# Patient Record
Sex: MALE | Race: WHITE | URBAN - METROPOLITAN AREA
[De-identification: names, ages, dates, MRNs, and addresses within clinical notes are randomized per-mention and may not be internally consistent; named-entity substitution may affect disease eponyms.]

---

## 2019-08-01 ENCOUNTER — OFFICE VISIT (OUTPATIENT)
Dept: INTERNAL MEDICINE CLINIC | Age: 29
End: 2019-08-01

## 2019-08-01 VITALS
OXYGEN SATURATION: 99 % | SYSTOLIC BLOOD PRESSURE: 112 MMHG | TEMPERATURE: 98.5 F | WEIGHT: 210.5 LBS | DIASTOLIC BLOOD PRESSURE: 82 MMHG | RESPIRATION RATE: 18 BRPM | HEIGHT: 70 IN | HEART RATE: 81 BPM | BODY MASS INDEX: 30.14 KG/M2

## 2019-08-01 DIAGNOSIS — M79.674 TOE PAIN, RIGHT: ICD-10-CM

## 2019-08-01 DIAGNOSIS — M79.674 PAIN AND SWELLING OF TOE, RIGHT: Primary | ICD-10-CM

## 2019-08-01 DIAGNOSIS — E66.9 OBESITY (BMI 30-39.9): ICD-10-CM

## 2019-08-01 DIAGNOSIS — M79.89 PAIN AND SWELLING OF TOE, RIGHT: Primary | ICD-10-CM

## 2019-08-01 NOTE — PROGRESS NOTES
[facility]  Vanessa Weaver. MD Susie  [address] [address]    Chief Complaint   Patient presents with    Toe Injury     pt states he believes he broke his big toe on right foot        SUBJECTIVE:    Macario Nur is a 26 y.o. male who presents for evaluation of right great toe pain and swelling. Throbbing pain onset after  playing soccer  9 days ago. No injury history. Pain improved on the same day, but returned along with swelling 5 days later. There is no pain at rest today, but pain averages 3-4/10 with walking. Patient used aspirin on a couple of occasions. Prior arm fracture during childhood. History reviewed. No pertinent past medical history. History reviewed. No pertinent surgical history. No Known Allergies    REVIEW OF SYSTEMS:  Per HPI    Social History     Socioeconomic History    Marital status: UNKNOWN     Spouse name: Not on file    Number of children: Not on file    Years of education: Not on file    Highest education level: Not on file   Tobacco Use    Smoking status: Never Smoker    Smokeless tobacco: Never Used   Substance and Sexual Activity    Alcohol use: Yes    Drug use: Never     History reviewed. No pertinent family history. OBJECTIVE:     Visit Vitals  /82   Pulse 81   Temp 98.5 °F (36.9 °C) (Oral)   Resp 18   Ht 5' 10\" (1.778 m)   Wt 210 lb 8 oz (95.5 kg)   SpO2 99%   BMI 30.20 kg/m²     CONSTITUTIONAL: well developed, obese, appears age appropriate  EYES: perrla, eom intact  ENMT:moist mucous membranes, pharynx clear  NECK: supple.  Thyroid normal  RESPIRATORY: Chest: clear bilaterally  CARDIOVASCULAR: Heart: regular rate and rhythm  GASTROINTESTINAL: Abdomen: soft, bowel sounds active  HEMATOLOGIC: no pathological lymph nodes palpated  MUSCULOSKELETAL: Extremities,rt great toe:  mildly swollen on inspection; point tenderness over proximal phalanx; capillary refill is brisk; sensation is intact;pt can wiggle the toe; other toes and joints are within normal limits  INTEGUMENT: No unusual rashes or suspicious skin lesions noted. Nails appear normal.  NEUROLOGIC: non-focal exam   MENTAL STATUS: alert and oriented, appropriate affect     No results found for any previous visit. ASSESSMENT:   1. Right great toe pain and swelling,  rule out fracture   2. Obesity (BMI 30-39. 9)      I have discussed the diagnosis with the patient and the intended plan as seen in the  orders above. The patient understands and agees with the plan.   The patient has   received an after visit summary and questions were answered concerning  future plans  Patient labs and/or xrays were reviewed    PLAN:  .  Orders Placed This Encounter    XR FOOT RT MIN 3 V         Weight reduction advised as well as avoidance of processed foods and high fructose corn syrup, exercise 150 mi weekly and weight          training to involve all major muscle groups twice weekly

## 2019-08-01 NOTE — PROGRESS NOTES
Chief Complaint   Patient presents with    Toe Injury     pt states he believes he broke his big toe on right foot      1. Have you been to the ER, urgent care clinic since your last visit? Hospitalized since your last visit? No    2. Have you seen or consulted any other health care providers outside of the [address] [place] [address] since your last visit? Include any pap smears or colon screening.  No